# Patient Record
Sex: FEMALE | Race: WHITE | NOT HISPANIC OR LATINO | Employment: OTHER | URBAN - METROPOLITAN AREA
[De-identification: names, ages, dates, MRNs, and addresses within clinical notes are randomized per-mention and may not be internally consistent; named-entity substitution may affect disease eponyms.]

---

## 2018-10-04 ENCOUNTER — NEW PATIENT COMPREHENSIVE (OUTPATIENT)
Dept: URBAN - METROPOLITAN AREA CLINIC 35 | Facility: CLINIC | Age: 63
End: 2018-10-04

## 2018-10-04 DIAGNOSIS — H43.812: ICD-10-CM

## 2018-10-04 DIAGNOSIS — H25.813: ICD-10-CM

## 2018-10-04 DIAGNOSIS — H40.1130: ICD-10-CM

## 2018-10-04 PROCEDURE — 92015 DETERMINE REFRACTIVE STATE: CPT

## 2018-10-04 PROCEDURE — 99204 OFFICE O/P NEW MOD 45 MIN: CPT

## 2018-10-04 PROCEDURE — 92250 FUNDUS PHOTOGRAPHY W/I&R: CPT

## 2018-10-04 ASSESSMENT — VISUAL ACUITY
OS_SC: 20/50+2
OS_CC: J6
OD_SC: 20/40-1
OD_CC: J8
OD_BAT: 20/40
OS_BAT: 20/40

## 2018-10-04 ASSESSMENT — TONOMETRY
OS_IOP_MMHG: 20
OS_IOP_MMHG: 19
OD_IOP_MMHG: 19
OD_IOP_MMHG: 21

## 2018-10-04 ASSESSMENT — KERATOMETRY
OD_AXISANGLE2_DEGREES: 129
OS_AXISANGLE2_DEGREES: 070
OD_K2POWER_DIOPTERS: 43.50
OD_K1POWER_DIOPTERS: 44.50
OD_AXISANGLE_DEGREES: 039
OS_K1POWER_DIOPTERS: 44.75
OS_K2POWER_DIOPTERS: 44.50
OS_AXISANGLE_DEGREES: 160

## 2018-11-07 NOTE — PATIENT DISCUSSION
REVIEWED OPTION OF TX VS WAITING AND POSSIBLE DOWNSIDE OF WAITING BEING LESS RECOVERY OF VA IN AREA OF BR[facility] - PT WANTS TO WAIT AS CENTRAL VA IS 20/20.

## 2018-12-12 ENCOUNTER — CATARACT EVALUATION (OUTPATIENT)
Dept: URBAN - METROPOLITAN AREA CLINIC 39 | Facility: CLINIC | Age: 63
End: 2018-12-12

## 2018-12-12 VITALS
HEIGHT: 55 IN | RESPIRATION RATE: 16 BRPM | DIASTOLIC BLOOD PRESSURE: 74 MMHG | HEART RATE: 52 BPM | SYSTOLIC BLOOD PRESSURE: 129 MMHG

## 2018-12-12 DIAGNOSIS — H25.813: ICD-10-CM

## 2018-12-12 DIAGNOSIS — H40.1130: ICD-10-CM

## 2018-12-12 PROCEDURE — 92136TC INTERFEROMETRY - TECHNICAL COMPONENT

## 2018-12-12 PROCEDURE — 92133 CPTRZD OPH DX IMG PST SGM ON: CPT

## 2018-12-12 PROCEDURE — 92014 COMPRE OPH EXAM EST PT 1/>: CPT

## 2018-12-12 PROCEDURE — 92025-3 CORNEAL TOPO, REFUSED

## 2018-12-12 PROCEDURE — 76514 ECHO EXAM OF EYE THICKNESS: CPT

## 2018-12-12 ASSESSMENT — VISUAL ACUITY
OD_CC: J2
OS_CC: J5
OD_BAT: 20/60
OS_SC: 20/50
OD_SC: J12
OD_PAM: 20/20
OS_AM: 20/20
OD_SC: 20/30-1
OS_BAT: 20/60
OS_SC: J12

## 2018-12-12 ASSESSMENT — KERATOMETRY
OS_K1POWER_DIOPTERS: 44.75
OD_AXISANGLE2_DEGREES: 129
OD_K1POWER_DIOPTERS: 44.50
OS_AXISANGLE2_DEGREES: 070
OD_AXISANGLE_DEGREES: 039
OD_K2POWER_DIOPTERS: 43.50
OS_AXISANGLE_DEGREES: 160
OS_K2POWER_DIOPTERS: 44.50

## 2018-12-12 ASSESSMENT — TONOMETRY
OS_IOP_MMHG: 16
OD_IOP_MMHG: 16

## 2018-12-12 ASSESSMENT — PACHYMETRY
OS_CT_UM: 582
OD_CT_UM: 576

## 2019-10-29 ENCOUNTER — ESTABLISHED COMPREHENSIVE EXAM (OUTPATIENT)
Dept: URBAN - METROPOLITAN AREA CLINIC 35 | Facility: CLINIC | Age: 64
End: 2019-10-29

## 2019-10-29 DIAGNOSIS — H40.033: ICD-10-CM

## 2019-10-29 DIAGNOSIS — H04.123: ICD-10-CM

## 2019-10-29 DIAGNOSIS — H25.813: ICD-10-CM

## 2019-10-29 DIAGNOSIS — H43.812: ICD-10-CM

## 2019-10-29 DIAGNOSIS — H40.1130: ICD-10-CM

## 2019-10-29 PROCEDURE — 92134 CPTRZ OPH DX IMG PST SGM RTA: CPT

## 2019-10-29 PROCEDURE — 92014 COMPRE OPH EXAM EST PT 1/>: CPT

## 2019-10-29 PROCEDURE — 92133 CPTRZD OPH DX IMG PST SGM ON: CPT

## 2019-10-29 PROCEDURE — 92015 DETERMINE REFRACTIVE STATE: CPT

## 2019-10-29 ASSESSMENT — KERATOMETRY
OD_AXISANGLE_DEGREES: 146
OS_AXISANGLE2_DEGREES: 99
OS_AXISANGLE_DEGREES: 9
OS_K2POWER_DIOPTERS: 44.25
OS_K1POWER_DIOPTERS: 44.75
OD_K2POWER_DIOPTERS: 44.25
OD_K1POWER_DIOPTERS: 44
OD_AXISANGLE2_DEGREES: 56

## 2019-10-29 ASSESSMENT — TONOMETRY
OD_IOP_MMHG: 14
OS_IOP_MMHG: 15

## 2019-10-29 ASSESSMENT — VISUAL ACUITY
OS_SC: 20/70
OD_CC: J3
OD_SC: 20/40
OS_CC: J12
OD_BAT: 20/40 W/MR

## 2019-12-06 ENCOUNTER — TECH ONLY (OUTPATIENT)
Dept: URBAN - METROPOLITAN AREA CLINIC 35 | Facility: CLINIC | Age: 64
End: 2019-12-06

## 2019-12-06 DIAGNOSIS — H40.1130: ICD-10-CM

## 2019-12-06 DIAGNOSIS — H40.033: ICD-10-CM

## 2019-12-06 PROCEDURE — 99211T TECH SERVICE

## 2019-12-06 PROCEDURE — 92083 EXTENDED VISUAL FIELD XM: CPT

## 2019-12-06 ASSESSMENT — KERATOMETRY
OS_K2POWER_DIOPTERS: 44.25
OD_AXISANGLE_DEGREES: 146
OS_K1POWER_DIOPTERS: 44.75
OD_K1POWER_DIOPTERS: 44
OD_K2POWER_DIOPTERS: 44.25
OS_AXISANGLE_DEGREES: 9
OD_AXISANGLE2_DEGREES: 56
OS_AXISANGLE2_DEGREES: 99

## 2020-01-10 ASSESSMENT — KERATOMETRY
OD_AXISANGLE2_DEGREES: 56
OS_AXISANGLE_DEGREES: 9
OS_K2POWER_DIOPTERS: 44.25
OD_AXISANGLE_DEGREES: 146
OS_K1POWER_DIOPTERS: 44.75
OS_AXISANGLE2_DEGREES: 99
OD_K1POWER_DIOPTERS: 44
OD_K2POWER_DIOPTERS: 44.25

## 2020-01-13 ENCOUNTER — CATARACT EVALUATION (OUTPATIENT)
Dept: URBAN - METROPOLITAN AREA CLINIC 39 | Facility: CLINIC | Age: 65
End: 2020-01-13

## 2020-01-13 DIAGNOSIS — H43.812: ICD-10-CM

## 2020-01-13 DIAGNOSIS — H40.1131: ICD-10-CM

## 2020-01-13 DIAGNOSIS — H04.123: ICD-10-CM

## 2020-01-13 DIAGNOSIS — H25.813: ICD-10-CM

## 2020-01-13 DIAGNOSIS — H40.033: ICD-10-CM

## 2020-01-13 PROCEDURE — 92136TC INTERFEROMETRY - TECHNICAL COMPONENT

## 2020-01-13 PROCEDURE — 92025-2 CORNEAL TOPOGRAPHY, PT

## 2020-01-13 PROCEDURE — 92250 FUNDUS PHOTOGRAPHY W/I&R: CPT

## 2020-01-13 PROCEDURE — 92020 GONIOSCOPY: CPT

## 2020-01-13 PROCEDURE — V2799PMN IMPRIMIS PRED-MOXI-NEPAF 5ML

## 2020-01-13 PROCEDURE — 92014 COMPRE OPH EXAM EST PT 1/>: CPT

## 2020-01-13 PROCEDURE — 92202 OPSCPY EXTND ON/MAC DRAW: CPT

## 2020-01-13 ASSESSMENT — VISUAL ACUITY
OD_SC: J12
OD_RAM: 20/20
OS_AM: 20/25
OD_CC: J3
OS_SC: J12
OS_CC: J6
OS_PH: 20/25+2
OS_SC: 20/60
OD_BAT: 20/60 WITH MR
OD_SC: 20/30+2
OS_BAT: 20/70 WITH MR

## 2020-01-13 ASSESSMENT — TONOMETRY
OD_IOP_MMHG: 14
OS_IOP_MMHG: 14

## 2020-01-27 ENCOUNTER — PRE-OP/H&P (OUTPATIENT)
Dept: URBAN - METROPOLITAN AREA CLINIC 39 | Facility: CLINIC | Age: 65
End: 2020-01-27

## 2020-01-27 ENCOUNTER — SURGERY/PROCEDURE (OUTPATIENT)
Dept: URBAN - METROPOLITAN AREA CLINIC 39 | Facility: CLINIC | Age: 65
End: 2020-01-27

## 2020-01-27 DIAGNOSIS — H25.813: ICD-10-CM

## 2020-01-27 DIAGNOSIS — H40.1111: ICD-10-CM

## 2020-01-27 DIAGNOSIS — H25.811: ICD-10-CM

## 2020-01-27 DIAGNOSIS — H40.1131: ICD-10-CM

## 2020-01-27 PROCEDURE — 0376T INSERTION OF ANTERIOR SEGMENT AQUEOUS DRAINAGE DEVICE, WITHOUT EXTRAOCULAR RESERVOIR, INTERNAL APPROACH, INTO THE TRABECULAR MESHWORK; EACH ADDITIONAL DEVICE INSERTION (LIST SEPARATELY IN ADDITION TO CODE FOR PRIMARY PROCEDURE): CPT

## 2020-01-27 PROCEDURE — 0191T INSERTION OF ANTERIOR SEGMENT AQUEOUS DRAINAGE DEVICE, WITHOUT EXTRAOCULAR RESERVOIR; INTERNAL APPROACH, INTO THE TRABECULAR MESHWORK, INITIAL INSERTION: CPT

## 2020-01-27 PROCEDURE — 66999LNSR LENSAR LASER FOR CAT SX

## 2020-01-27 PROCEDURE — 99211HP H&P OFFICE/OUTPATIENT VISIT, EST

## 2020-01-27 PROCEDURE — 66984AV REMOVE CATARACT, INSERT ADVANCED LENS

## 2020-01-27 ASSESSMENT — KERATOMETRY
OD_K1POWER_DIOPTERS: 44
OS_AXISANGLE_DEGREES: 9
OS_K2POWER_DIOPTERS: 44.25
OD_AXISANGLE2_DEGREES: 56
OD_K2POWER_DIOPTERS: 44.25
OD_AXISANGLE_DEGREES: 146
OS_AXISANGLE2_DEGREES: 99
OS_K1POWER_DIOPTERS: 44.75

## 2020-01-28 ENCOUNTER — CATARACT POST-OP 1-DAY (OUTPATIENT)
Dept: URBAN - METROPOLITAN AREA CLINIC 35 | Facility: CLINIC | Age: 65
End: 2020-01-28

## 2020-01-28 DIAGNOSIS — Z96.1: ICD-10-CM

## 2020-01-28 PROCEDURE — 99024 POSTOP FOLLOW-UP VISIT: CPT

## 2020-01-28 ASSESSMENT — VISUAL ACUITY
OS_SC: 20/40+2
OD_SC: 20/20-1
OD_SC: J10
OS_SC: J12

## 2020-01-28 ASSESSMENT — KERATOMETRY
OD_K1POWER_DIOPTERS: 44
OS_K1POWER_DIOPTERS: 44.75
OS_AXISANGLE2_DEGREES: 99
OS_AXISANGLE2_DEGREES: 99
OS_K2POWER_DIOPTERS: 44.25
OS_AXISANGLE_DEGREES: 9
OD_K2POWER_DIOPTERS: 44.25
OD_K2POWER_DIOPTERS: 44.25
OD_AXISANGLE2_DEGREES: 56
OS_AXISANGLE_DEGREES: 9
OS_K1POWER_DIOPTERS: 44.75
OD_AXISANGLE_DEGREES: 146
OD_AXISANGLE_DEGREES: 146
OS_K2POWER_DIOPTERS: 44.25
OD_K1POWER_DIOPTERS: 44
OD_AXISANGLE2_DEGREES: 56

## 2020-01-28 ASSESSMENT — TONOMETRY
OS_IOP_MMHG: 17
OD_IOP_MMHG: 18

## 2020-02-03 ENCOUNTER — SURGERY/PROCEDURE (OUTPATIENT)
Dept: URBAN - METROPOLITAN AREA CLINIC 39 | Facility: CLINIC | Age: 65
End: 2020-02-03

## 2020-02-03 ENCOUNTER — POST OP/EVAL OF SECOND EYE (OUTPATIENT)
Dept: URBAN - METROPOLITAN AREA CLINIC 39 | Facility: CLINIC | Age: 65
End: 2020-02-03

## 2020-02-03 DIAGNOSIS — H25.812: ICD-10-CM

## 2020-02-03 DIAGNOSIS — H40.1121: ICD-10-CM

## 2020-02-03 DIAGNOSIS — Z96.1: ICD-10-CM

## 2020-02-03 DIAGNOSIS — H40.1131: ICD-10-CM

## 2020-02-03 PROCEDURE — 66999LNSR LENSAR LASER FOR CAT SX

## 2020-02-03 PROCEDURE — 99211HP H&P OFFICE/OUTPATIENT VISIT, EST

## 2020-02-03 PROCEDURE — 66984AV REMOVE CATARACT, INSERT ADVANCED LENS

## 2020-02-03 PROCEDURE — 0376T INSERTION OF ANTERIOR SEGMENT AQUEOUS DRAINAGE DEVICE, WITHOUT EXTRAOCULAR RESERVOIR, INTERNAL APPROACH, INTO THE TRABECULAR MESHWORK; EACH ADDITIONAL DEVICE INSERTION (LIST SEPARATELY IN ADDITION TO CODE FOR PRIMARY PROCEDURE): CPT

## 2020-02-03 PROCEDURE — 0191T INSERTION OF ANTERIOR SEGMENT AQUEOUS DRAINAGE DEVICE, WITHOUT EXTRAOCULAR RESERVOIR; INTERNAL APPROACH, INTO THE TRABECULAR MESHWORK, INITIAL INSERTION: CPT

## 2020-02-03 ASSESSMENT — KERATOMETRY
OS_K2POWER_DIOPTERS: 44.25
OD_AXISANGLE2_DEGREES: 56
OS_K1POWER_DIOPTERS: 44.75
OS_K2POWER_DIOPTERS: 44.25
OD_K2POWER_DIOPTERS: 44.25
OS_K1POWER_DIOPTERS: 44.75
OD_K2POWER_DIOPTERS: 44.25
OD_AXISANGLE_DEGREES: 146
OD_K1POWER_DIOPTERS: 44
OS_AXISANGLE2_DEGREES: 99
OD_AXISANGLE2_DEGREES: 56
OS_AXISANGLE2_DEGREES: 99
OD_K1POWER_DIOPTERS: 44
OS_AXISANGLE_DEGREES: 9
OD_AXISANGLE_DEGREES: 146
OS_AXISANGLE_DEGREES: 9

## 2020-02-03 ASSESSMENT — VISUAL ACUITY
OD_SC: 20/25
OD_CC: J1

## 2020-02-04 ENCOUNTER — CATARACT POST-OP 1-DAY (OUTPATIENT)
Dept: URBAN - METROPOLITAN AREA CLINIC 35 | Facility: CLINIC | Age: 65
End: 2020-02-04

## 2020-02-04 DIAGNOSIS — Z96.1: ICD-10-CM

## 2020-02-04 PROCEDURE — 99024 POSTOP FOLLOW-UP VISIT: CPT

## 2020-02-04 RX ORDER — BRIMONIDINE TARTRATE, TIMOLOL MALEATE 2; 5 MG/ML; MG/ML
1 SOLUTION/ DROPS OPHTHALMIC TWICE A DAY
Start: 2020-02-04 | End: 2020-02-11

## 2020-02-04 ASSESSMENT — KERATOMETRY
OD_K2POWER_DIOPTERS: 44.25
OD_K1POWER_DIOPTERS: 44
OS_AXISANGLE2_DEGREES: 99
OS_AXISANGLE_DEGREES: 9
OD_AXISANGLE_DEGREES: 146
OS_K1POWER_DIOPTERS: 44.75
OS_K2POWER_DIOPTERS: 44.25
OD_AXISANGLE2_DEGREES: 56

## 2020-02-04 ASSESSMENT — TONOMETRY
OS_IOP_MMHG: 9
OD_IOP_MMHG: 22
OS_IOP_MMHG: 12
OD_IOP_MMHG: 29

## 2020-02-04 ASSESSMENT — VISUAL ACUITY
OS_SC: J3
OS_SC: 20/25+2
OD_SC: J2-
OU_SC: 20/25-1
OU_SC: J1

## 2020-02-10 ENCOUNTER — POST-OP CATARACT (OUTPATIENT)
Dept: URBAN - METROPOLITAN AREA CLINIC 35 | Facility: CLINIC | Age: 65
End: 2020-02-10

## 2020-02-10 DIAGNOSIS — H40.1131: ICD-10-CM

## 2020-02-10 DIAGNOSIS — H40.1121: ICD-10-CM

## 2020-02-10 DIAGNOSIS — Z96.1: ICD-10-CM

## 2020-02-10 PROCEDURE — 99024 POSTOP FOLLOW-UP VISIT: CPT

## 2020-02-10 ASSESSMENT — TONOMETRY
OS_IOP_MMHG: 25
OD_IOP_MMHG: 13
OD_IOP_MMHG: 11
OS_IOP_MMHG: 23
OD_IOP_MMHG: 12
OS_IOP_MMHG: 22

## 2020-02-10 ASSESSMENT — KERATOMETRY
OS_K1POWER_DIOPTERS: 44.75
OS_AXISANGLE2_DEGREES: 99
OS_AXISANGLE_DEGREES: 9
OS_K2POWER_DIOPTERS: 44.25
OD_K2POWER_DIOPTERS: 44.25
OD_AXISANGLE2_DEGREES: 56
OD_K1POWER_DIOPTERS: 44
OD_AXISANGLE_DEGREES: 146

## 2020-02-10 ASSESSMENT — VISUAL ACUITY
OD_SC: 20/20
OS_SC: 20/30-1

## 2020-02-20 ENCOUNTER — POST-OP CATARACT (OUTPATIENT)
Dept: URBAN - METROPOLITAN AREA CLINIC 35 | Facility: CLINIC | Age: 65
End: 2020-02-20

## 2020-02-20 DIAGNOSIS — H26.493: ICD-10-CM

## 2020-02-20 DIAGNOSIS — H40.1131: ICD-10-CM

## 2020-02-20 DIAGNOSIS — Z96.1: ICD-10-CM

## 2020-02-20 DIAGNOSIS — H40.1121: ICD-10-CM

## 2020-02-20 PROCEDURE — 99024 POSTOP FOLLOW-UP VISIT: CPT

## 2020-02-20 ASSESSMENT — KERATOMETRY
OS_K1POWER_DIOPTERS: 44.75
OS_K2POWER_DIOPTERS: 44.25
OD_AXISANGLE2_DEGREES: 56
OD_K1POWER_DIOPTERS: 44
OD_K2POWER_DIOPTERS: 44.25
OS_AXISANGLE_DEGREES: 9
OS_AXISANGLE2_DEGREES: 99
OD_AXISANGLE_DEGREES: 146

## 2020-02-20 ASSESSMENT — VISUAL ACUITY
OD_SC: J5
OS_SC: 20/30+2
OD_SC: 20/20-2
OS_SC: J3

## 2020-02-20 ASSESSMENT — TONOMETRY
OD_IOP_MMHG: 15
OS_IOP_MMHG: 15

## 2020-06-05 ASSESSMENT — KERATOMETRY
OD_K2POWER_DIOPTERS: 44.25
OD_K1POWER_DIOPTERS: 44
OD_AXISANGLE2_DEGREES: 56
OS_K1POWER_DIOPTERS: 44.75
OS_AXISANGLE_DEGREES: 9
OD_AXISANGLE_DEGREES: 146
OS_K2POWER_DIOPTERS: 44.25
OS_AXISANGLE2_DEGREES: 99

## 2020-06-08 ENCOUNTER — SURGERY/PROCEDURE (OUTPATIENT)
Dept: URBAN - METROPOLITAN AREA SURGERY 14 | Facility: SURGERY | Age: 65
End: 2020-06-08

## 2020-06-08 ENCOUNTER — CONSULT (OUTPATIENT)
Dept: URBAN - METROPOLITAN AREA CLINIC 39 | Facility: CLINIC | Age: 65
End: 2020-06-08

## 2020-06-08 DIAGNOSIS — H26.493: ICD-10-CM

## 2020-06-08 PROCEDURE — 6682150 YAG CAPSULOTOMY

## 2020-06-08 PROCEDURE — 92014 COMPRE OPH EXAM EST PT 1/>: CPT

## 2020-06-08 ASSESSMENT — TONOMETRY
OS_IOP_MMHG: 12
OD_IOP_MMHG: 13

## 2020-06-08 ASSESSMENT — VISUAL ACUITY
OD_SC: 20/20-1
OS_CC: J6
OD_CC: J6
OD_SC: J12
OS_SC: J10
OS_SC: 20/25
OD_BAT: 20/40 WITH MR
OS_BAT: 20/40 WITH MR

## 2020-06-12 ASSESSMENT — KERATOMETRY
OD_AXISANGLE_DEGREES: 146
OS_AXISANGLE_DEGREES: 9
OD_AXISANGLE2_DEGREES: 56
OS_K1POWER_DIOPTERS: 44.75
OS_K2POWER_DIOPTERS: 44.25
OD_K1POWER_DIOPTERS: 44
OS_AXISANGLE2_DEGREES: 99
OD_K2POWER_DIOPTERS: 44.25

## 2020-06-30 ASSESSMENT — KERATOMETRY
OS_K2POWER_DIOPTERS: 44.25
OD_K1POWER_DIOPTERS: 44
OS_K1POWER_DIOPTERS: 44.75
OS_AXISANGLE_DEGREES: 9
OD_K2POWER_DIOPTERS: 44.25
OD_AXISANGLE2_DEGREES: 56
OD_AXISANGLE_DEGREES: 146
OS_AXISANGLE2_DEGREES: 99

## 2020-07-01 ENCOUNTER — YAG POST-OP (OUTPATIENT)
Dept: URBAN - METROPOLITAN AREA CLINIC 35 | Facility: CLINIC | Age: 65
End: 2020-07-01

## 2020-07-01 DIAGNOSIS — Z98.890: ICD-10-CM

## 2020-07-01 DIAGNOSIS — Z96.1: ICD-10-CM

## 2020-07-01 PROCEDURE — 99024 POSTOP FOLLOW-UP VISIT: CPT

## 2020-07-01 ASSESSMENT — TONOMETRY
OS_IOP_MMHG: 14
OD_IOP_MMHG: 14

## 2020-07-01 ASSESSMENT — VISUAL ACUITY
OD_SC: 20/25
OS_SC: J8
OU_SC: J8
OU_SC: 20/20
OD_SC: J8
OS_SC: 20/30

## 2020-07-27 ENCOUNTER — FOLLOW UP (OUTPATIENT)
Dept: URBAN - METROPOLITAN AREA CLINIC 35 | Facility: CLINIC | Age: 65
End: 2020-07-27

## 2020-07-27 DIAGNOSIS — H04.123: ICD-10-CM

## 2020-07-27 PROCEDURE — 92012 INTRM OPH EXAM EST PATIENT: CPT

## 2020-07-27 ASSESSMENT — TONOMETRY
OS_IOP_MMHG: 15
OD_IOP_MMHG: 15

## 2020-07-27 ASSESSMENT — KERATOMETRY
OD_K2POWER_DIOPTERS: 44.25
OD_AXISANGLE_DEGREES: 146
OS_AXISANGLE2_DEGREES: 99
OS_K1POWER_DIOPTERS: 44.75
OD_AXISANGLE2_DEGREES: 56
OD_K1POWER_DIOPTERS: 44
OS_AXISANGLE_DEGREES: 9
OS_K2POWER_DIOPTERS: 44.25

## 2020-07-27 ASSESSMENT — VISUAL ACUITY
OS_SC: 20/40
OD_SC: 20/25+2
OD_PH: 20/20
OS_PH: 20/25

## 2020-10-28 ENCOUNTER — FOLLOW UP (OUTPATIENT)
Dept: URBAN - METROPOLITAN AREA CLINIC 35 | Facility: CLINIC | Age: 65
End: 2020-10-28

## 2020-10-28 DIAGNOSIS — Z98.890: ICD-10-CM

## 2020-10-28 DIAGNOSIS — H04.123: ICD-10-CM

## 2020-10-28 DIAGNOSIS — Z96.1: ICD-10-CM

## 2020-10-28 DIAGNOSIS — H43.812: ICD-10-CM

## 2020-10-28 PROCEDURE — 99024 POSTOP FOLLOW-UP VISIT: CPT

## 2020-10-28 ASSESSMENT — VISUAL ACUITY
OD_SC: J10
OD_SC: 20/25
OS_SC: J10
OS_SC: 20/30-1

## 2020-10-28 ASSESSMENT — TONOMETRY
OD_IOP_MMHG: 15
OS_IOP_MMHG: 16

## 2022-07-07 ENCOUNTER — COMPREHENSIVE EXAM (OUTPATIENT)
Dept: URBAN - METROPOLITAN AREA CLINIC 35 | Facility: CLINIC | Age: 67
End: 2022-07-07

## 2022-07-07 DIAGNOSIS — H52.7: ICD-10-CM

## 2022-07-07 DIAGNOSIS — H40.1131: ICD-10-CM

## 2022-07-07 DIAGNOSIS — H26.493: ICD-10-CM

## 2022-07-07 DIAGNOSIS — H04.123: ICD-10-CM

## 2022-07-07 DIAGNOSIS — Z96.1: ICD-10-CM

## 2022-07-07 DIAGNOSIS — H40.1111: ICD-10-CM

## 2022-07-07 DIAGNOSIS — H43.812: ICD-10-CM

## 2022-07-07 DIAGNOSIS — Z98.890: ICD-10-CM

## 2022-07-07 DIAGNOSIS — H40.033: ICD-10-CM

## 2022-07-07 PROCEDURE — 92015 DETERMINE REFRACTIVE STATE: CPT

## 2022-07-07 PROCEDURE — 92014 COMPRE OPH EXAM EST PT 1/>: CPT

## 2022-07-07 ASSESSMENT — VISUAL ACUITY
OD_SC: 20/25-1
OU_SC: J6
OS_SC: J6
OS_SC: 20/60+1
OD_SC: J10
OS_PH: 20/25-1
OU_SC: 20/20-2

## 2022-07-07 ASSESSMENT — TONOMETRY
OD_IOP_MMHG: 15
OS_IOP_MMHG: 15

## 2022-07-12 ENCOUNTER — CONSULTATION/EVALUATION (OUTPATIENT)
Dept: URBAN - METROPOLITAN AREA CLINIC 39 | Facility: CLINIC | Age: 67
End: 2022-07-12

## 2022-07-12 DIAGNOSIS — H26.493: ICD-10-CM

## 2022-07-12 DIAGNOSIS — H52.7: ICD-10-CM

## 2022-07-12 DIAGNOSIS — H40.1131: ICD-10-CM

## 2022-07-12 PROCEDURE — 92014 COMPRE OPH EXAM EST PT 1/>: CPT

## 2022-07-12 ASSESSMENT — TONOMETRY
OS_IOP_MMHG: 13
OD_IOP_MMHG: 14

## 2022-07-12 ASSESSMENT — VISUAL ACUITY
OS_SC: J5
OS_SC: 20/50+2
OD_SC: J8
OD_SC: 20/25
OS_PH: 20/40

## 2022-08-09 ENCOUNTER — FOLLOW UP (OUTPATIENT)
Dept: URBAN - METROPOLITAN AREA CLINIC 39 | Facility: CLINIC | Age: 67
End: 2022-08-09

## 2022-08-09 DIAGNOSIS — H26.493: ICD-10-CM

## 2022-08-09 DIAGNOSIS — H40.1131: ICD-10-CM

## 2022-08-09 DIAGNOSIS — H52.7: ICD-10-CM

## 2022-08-09 DIAGNOSIS — Z98.890: ICD-10-CM

## 2022-08-09 DIAGNOSIS — Z96.1: ICD-10-CM

## 2022-08-09 PROCEDURE — 92012 INTRM OPH EXAM EST PATIENT: CPT

## 2022-08-09 ASSESSMENT — VISUAL ACUITY
OS_SC: 20/30-1
OD_SC: 20/25
OS_SC: J6-
OD_SC: J8

## 2022-08-09 ASSESSMENT — TONOMETRY
OD_IOP_MMHG: 14
OS_IOP_MMHG: 14

## 2024-02-13 ENCOUNTER — EMERGENCY VISIT (OUTPATIENT)
Dept: URBAN - METROPOLITAN AREA CLINIC 38 | Facility: CLINIC | Age: 69
End: 2024-02-13

## 2024-02-13 DIAGNOSIS — H40.1131: ICD-10-CM

## 2024-02-13 DIAGNOSIS — H04.123: ICD-10-CM

## 2024-02-13 PROCEDURE — 99213 OFFICE O/P EST LOW 20 MIN: CPT

## 2024-02-13 RX ORDER — OLOPATADINE HYDROCHLORIDE 2 MG/ML: 1 SOLUTION OPHTHALMIC ONCE A DAY

## 2024-02-13 ASSESSMENT — VISUAL ACUITY
OU_SC: 20/25
OS_SC: 20/40-2
OD_SC: 20/25+2

## 2024-02-13 ASSESSMENT — TONOMETRY
OD_IOP_MMHG: 13
OS_IOP_MMHG: 13

## 2024-03-15 ENCOUNTER — TECH ONLY (OUTPATIENT)
Dept: URBAN - METROPOLITAN AREA CLINIC 35 | Facility: CLINIC | Age: 69
End: 2024-03-15

## 2024-03-15 DIAGNOSIS — H40.1131: ICD-10-CM

## 2024-03-15 PROCEDURE — 92083 EXTENDED VISUAL FIELD XM: CPT

## 2024-03-15 PROCEDURE — 99211T TECH SERVICE

## 2024-03-20 ENCOUNTER — COMPREHENSIVE EXAM (OUTPATIENT)
Dept: URBAN - METROPOLITAN AREA CLINIC 35 | Facility: CLINIC | Age: 69
End: 2024-03-20

## 2024-03-20 DIAGNOSIS — H43.812: ICD-10-CM

## 2024-03-20 DIAGNOSIS — H53.8: ICD-10-CM

## 2024-03-20 DIAGNOSIS — H40.1131: ICD-10-CM

## 2024-03-20 DIAGNOSIS — H40.033: ICD-10-CM

## 2024-03-20 DIAGNOSIS — H04.123: ICD-10-CM

## 2024-03-20 DIAGNOSIS — H52.7: ICD-10-CM

## 2024-03-20 PROCEDURE — 92134 CPTRZ OPH DX IMG PST SGM RTA: CPT

## 2024-03-20 PROCEDURE — 92133 CPTRZD OPH DX IMG PST SGM ON: CPT

## 2024-03-20 PROCEDURE — 92015 DETERMINE REFRACTIVE STATE: CPT

## 2024-03-20 PROCEDURE — 92014 COMPRE OPH EXAM EST PT 1/>: CPT

## 2024-03-20 ASSESSMENT — VISUAL ACUITY
OS_PH: 20/60-1
OS_SC: J6
OD_SC: J10-
OU_SC: 20/25-2
OU_SC: J6
OS_SC: 20/100
OD_SC: 20/25-1

## 2024-03-20 ASSESSMENT — TONOMETRY
OS_IOP_MMHG: 15
OD_IOP_MMHG: 15

## 2024-10-21 ENCOUNTER — TECH ONLY (OUTPATIENT)
Dept: URBAN - METROPOLITAN AREA CLINIC 35 | Facility: CLINIC | Age: 69
End: 2024-10-21

## 2024-10-21 DIAGNOSIS — H40.1131: ICD-10-CM

## 2024-10-21 PROCEDURE — 92083 EXTENDED VISUAL FIELD XM: CPT

## 2024-10-21 PROCEDURE — 99211T TECH SERVICE

## 2024-10-31 ENCOUNTER — PREPPED CHART (OUTPATIENT)
Dept: URBAN - METROPOLITAN AREA CLINIC 35 | Facility: CLINIC | Age: 69
End: 2024-10-31

## 2024-11-05 ENCOUNTER — COMPREHENSIVE EXAM (OUTPATIENT)
Dept: URBAN - METROPOLITAN AREA CLINIC 35 | Facility: CLINIC | Age: 69
End: 2024-11-05

## 2024-11-05 DIAGNOSIS — H43.812: ICD-10-CM

## 2024-11-05 DIAGNOSIS — H04.123: ICD-10-CM

## 2024-11-05 DIAGNOSIS — H53.8: ICD-10-CM

## 2024-11-05 DIAGNOSIS — H40.033: ICD-10-CM

## 2024-11-05 DIAGNOSIS — H40.1131: ICD-10-CM

## 2024-11-05 DIAGNOSIS — H52.7: ICD-10-CM

## 2024-11-05 PROCEDURE — 92250 FUNDUS PHOTOGRAPHY W/I&R: CPT

## 2024-11-05 PROCEDURE — 92014 COMPRE OPH EXAM EST PT 1/>: CPT

## 2024-11-05 PROCEDURE — 92015 DETERMINE REFRACTIVE STATE: CPT

## 2024-11-05 PROCEDURE — 92133 CPTRZD OPH DX IMG PST SGM ON: CPT

## 2024-12-04 ENCOUNTER — CONSULTATION/EVALUATION (OUTPATIENT)
Age: 69
End: 2024-12-04

## 2024-12-04 DIAGNOSIS — H40.1131: ICD-10-CM

## 2024-12-04 DIAGNOSIS — H04.123: ICD-10-CM

## 2024-12-04 DIAGNOSIS — H52.7: ICD-10-CM

## 2024-12-04 PROCEDURE — 92004 COMPRE OPH EXAM NEW PT 1/>: CPT

## 2024-12-04 PROCEDURE — 92025 CPTRIZED CORNEAL TOPOGRAPHY: CPT
